# Patient Record
Sex: FEMALE | URBAN - METROPOLITAN AREA
[De-identification: names, ages, dates, MRNs, and addresses within clinical notes are randomized per-mention and may not be internally consistent; named-entity substitution may affect disease eponyms.]

---

## 2017-03-10 ENCOUNTER — IMPORTED ENCOUNTER (OUTPATIENT)
Dept: URBAN - METROPOLITAN AREA CLINIC 9 | Facility: CLINIC | Age: 42
End: 2017-03-10

## 2017-06-16 ENCOUNTER — IMPORTED ENCOUNTER (OUTPATIENT)
Dept: URBAN - METROPOLITAN AREA CLINIC 9 | Facility: CLINIC | Age: 42
End: 2017-06-16

## 2018-04-10 NOTE — PATIENT DISCUSSION
Pinguecula Counseling:  I have explained to the patient at length the diagnosis of pinguecula and its pathophysiology. I recommended the patient adequately protect their eyes from excessive UV light and dry, keyla conditions. The use of artificial tears in dry conditions was encouraged. Return for follow-up as scheduled.

## 2018-06-01 ENCOUNTER — IMPORTED ENCOUNTER (OUTPATIENT)
Dept: URBAN - METROPOLITAN AREA CLINIC 9 | Facility: CLINIC | Age: 43
End: 2018-06-01

## 2019-06-28 ENCOUNTER — IMPORTED ENCOUNTER (OUTPATIENT)
Dept: URBAN - METROPOLITAN AREA CLINIC 9 | Facility: CLINIC | Age: 44
End: 2019-06-28

## 2021-01-05 ENCOUNTER — IMPORTED ENCOUNTER (OUTPATIENT)
Dept: URBAN - METROPOLITAN AREA CLINIC 9 | Facility: CLINIC | Age: 46
End: 2021-01-05

## 2021-10-15 ASSESSMENT — KERATOMETRY
OD_AXISANGLE2_DEGREES: 90
OS_K1POWER_DIOPTERS: 38.75
OS_K2POWER_DIOPTERS: 39
OS_AXISANGLE2_DEGREES: 180
OD_K2POWER_DIOPTERS: 38.5
OS_AXISANGLE_DEGREES: 51
OS_K1POWER_DIOPTERS: 39
OS_K1POWER_DIOPTERS: 38.75
OD_K1POWER_DIOPTERS: 38.5
OD_AXISANGLE2_DEGREES: 180
OD_AXISANGLE_DEGREES: 180
OS_K2POWER_DIOPTERS: 39
OS_K2POWER_DIOPTERS: 39
OD_K1POWER_DIOPTERS: 38.25
OS_AXISANGLE_DEGREES: 82
OD_AXISANGLE2_DEGREES: 114
OD_K2POWER_DIOPTERS: 38.5
OD_K1POWER_DIOPTERS: 38.5
OD_AXISANGLE_DEGREES: 90
OD_K2POWER_DIOPTERS: 38.5
OS_AXISANGLE2_DEGREES: 141
OD_AXISANGLE_DEGREES: 24
OS_AXISANGLE_DEGREES: 90
OS_AXISANGLE2_DEGREES: 172

## 2021-10-15 ASSESSMENT — VISUAL ACUITY
OS_SC: 20/20 SN
OD_CC: 20/20 SN
OD_SC: 20/20 SN
OS_CC: 20/20 +2 SN
OS_SC: 20/20 SN
OS_CC: 20/20 SN
OD_SC: 20/20 SN
OS_SC: 20/20 +2 SN
OS_SC: 20/20 SN
OD_CC: 20/20 SN
OD_SC: 20/20 SN
OS_SC: 20/20 +2 SN

## 2021-10-15 ASSESSMENT — TONOMETRY
OD_IOP_MMHG: 15
OS_IOP_MMHG: 15
OS_IOP_MMHG: 12
OD_IOP_MMHG: 11
OS_IOP_MMHG: 9
OD_IOP_MMHG: 9

## 2022-08-23 ENCOUNTER — ESTABLISHED PATIENT (OUTPATIENT)
Dept: URBAN - METROPOLITAN AREA CLINIC 14 | Facility: CLINIC | Age: 47
End: 2022-08-23

## 2022-08-23 DIAGNOSIS — H16.223: ICD-10-CM

## 2022-08-23 PROCEDURE — 92014 COMPRE OPH EXAM EST PT 1/>: CPT

## 2022-08-23 PROCEDURE — 92015 DETERMINE REFRACTIVE STATE: CPT

## 2022-08-23 ASSESSMENT — KERATOMETRY
OS_K1POWER_DIOPTERS: 39.00
OS_AXISANGLE_DEGREES: 151
OD_K1POWER_DIOPTERS: 38.50
OD_K2POWER_DIOPTERS: 38.75
OD_AXISANGLE2_DEGREES: 9
OS_AXISANGLE2_DEGREES: 61
OD_AXISANGLE_DEGREES: 99
OS_K2POWER_DIOPTERS: 39.25

## 2022-08-23 ASSESSMENT — VISUAL ACUITY
OD_SC: 20/20
OS_SC: 20/20-2
OU_SC: 20/20

## 2022-08-23 ASSESSMENT — TONOMETRY
OS_IOP_MMHG: 13
OD_IOP_MMHG: 13

## 2024-08-08 ENCOUNTER — COMPREHENSIVE EXAM (OUTPATIENT)
Dept: URBAN - METROPOLITAN AREA CLINIC 14 | Facility: CLINIC | Age: 49
End: 2024-08-08

## 2024-08-08 DIAGNOSIS — H16.223: ICD-10-CM

## 2024-08-08 PROCEDURE — 92014 COMPRE OPH EXAM EST PT 1/>: CPT

## 2024-08-08 ASSESSMENT — KERATOMETRY
OS_AXISANGLE2_DEGREES: 61
OD_AXISANGLE2_DEGREES: 9
OS_K1POWER_DIOPTERS: 39.00
OS_K2POWER_DIOPTERS: 39.25
OS_AXISANGLE_DEGREES: 151
OD_K1POWER_DIOPTERS: 38.50
OD_AXISANGLE_DEGREES: 99
OD_K2POWER_DIOPTERS: 38.75

## 2024-08-08 ASSESSMENT — TONOMETRY
OS_IOP_MMHG: 15
OD_IOP_MMHG: 16

## 2024-08-08 ASSESSMENT — VISUAL ACUITY
OD_SC: 20/20
OS_SC: 20/20